# Patient Record
Sex: FEMALE | URBAN - METROPOLITAN AREA
[De-identification: names, ages, dates, MRNs, and addresses within clinical notes are randomized per-mention and may not be internally consistent; named-entity substitution may affect disease eponyms.]

---

## 2017-03-20 ENCOUNTER — APPOINTMENT (RX ONLY)
Age: 62
Setting detail: DERMATOLOGY
End: 2017-03-20

## 2017-03-20 DIAGNOSIS — D22 MELANOCYTIC NEVI: ICD-10-CM | Status: STABLE

## 2017-03-20 DIAGNOSIS — L82.0 INFLAMED SEBORRHEIC KERATOSIS: ICD-10-CM

## 2017-03-20 DIAGNOSIS — L72.0 EPIDERMAL CYST: ICD-10-CM | Status: STABLE

## 2017-03-20 DIAGNOSIS — Z85.828 PERSONAL HISTORY OF OTHER MALIGNANT NEOPLASM OF SKIN: ICD-10-CM | Status: RESOLVED

## 2017-03-20 DIAGNOSIS — L82.1 OTHER SEBORRHEIC KERATOSIS: ICD-10-CM | Status: STABLE

## 2017-03-20 DIAGNOSIS — L23.9 ALLERGIC CONTACT DERMATITIS, UNSPECIFIED CAUSE: ICD-10-CM | Status: RESOLVING

## 2017-03-20 PROBLEM — D48.5 NEOPLASM OF UNCERTAIN BEHAVIOR OF SKIN: Status: ACTIVE | Noted: 2017-03-20

## 2017-03-20 PROBLEM — D22.5 MELANOCYTIC NEVI OF TRUNK: Status: ACTIVE | Noted: 2017-03-20

## 2017-03-20 PROCEDURE — ? BIOPSY BY SHAVE METHOD

## 2017-03-20 PROCEDURE — ? REFUSAL OF TREATMENT

## 2017-03-20 PROCEDURE — ? COSMETIC CONSULTATION: BROWN SPOTS

## 2017-03-20 PROCEDURE — 99214 OFFICE O/P EST MOD 30 MIN: CPT | Mod: 25

## 2017-03-20 PROCEDURE — 11100: CPT

## 2017-03-20 PROCEDURE — ? COUNSELING

## 2017-03-20 ASSESSMENT — SEVERITY ASSESSMENT: SEVERITY: ALMOST CLEAR

## 2017-03-20 ASSESSMENT — LOCATION SIMPLE DESCRIPTION DERM
LOCATION SIMPLE: LEFT ANTERIOR NECK
LOCATION SIMPLE: LEFT SUPERIOR EYELID
LOCATION SIMPLE: INFERIOR FOREHEAD
LOCATION SIMPLE: CHEST
LOCATION SIMPLE: LEFT FOREARM
LOCATION SIMPLE: LEFT FOREHEAD
LOCATION SIMPLE: RIGHT SUPERIOR EYELID

## 2017-03-20 ASSESSMENT — LOCATION ZONE DERM
LOCATION ZONE: NECK
LOCATION ZONE: TRUNK
LOCATION ZONE: ARM
LOCATION ZONE: FACE
LOCATION ZONE: EYELID

## 2017-03-20 ASSESSMENT — LOCATION DETAILED DESCRIPTION DERM
LOCATION DETAILED: LEFT VENTRAL PROXIMAL FOREARM
LOCATION DETAILED: LEFT INFERIOR FOREHEAD
LOCATION DETAILED: MIDDLE STERNUM
LOCATION DETAILED: RIGHT LATERAL SUPERIOR EYELID
LOCATION DETAILED: LEFT LATERAL SUPERIOR EYELID
LOCATION DETAILED: INFERIOR MID FOREHEAD
LOCATION DETAILED: LEFT INFERIOR ANTERIOR NECK
LOCATION DETAILED: UPPER STERNUM

## 2017-03-20 NOTE — PROCEDURE: BIOPSY BY SHAVE METHOD
Silver Nitrate Text: The wound bed was treated with silver nitrate after the biopsy was performed.
Post-Care Instructions: I reviewed with the patient in detail post-care instructions. Patient is to keep the biopsy site dry overnight, and then apply bacitracin twice daily until healed. Patient may apply hydrogen peroxide soaks to remove any crusting.
Notification Instructions: Patient will be notified of biopsy results. However, patient instructed to call the office if not contacted within 2 weeks.
Render Post-Care Instructions In Note?: no
Consent: Verbal consent was obtained and risks were reviewed including but not limited to scarring, infection, bleeding, scabbing, incomplete removal, nerve damage and allergy to anesthesia.
Additional Anesthesia Volume In Cc (Will Not Render If 0): 0
Anesthesia Type: 1% lidocaine without epinephrine
Biopsy Method: Dermablade
Electrodesiccation Text: The wound bed was treated with electrodesiccation after the biopsy was performed.
Curettage Text: The wound bed was treated with curettage after the biopsy was performed.
Billing Type: Third-Party Bill
Biopsy Type: H and E
Anesthesia Volume In Cc: 0.5
Detail Level: Detailed
Dressing: bandage
Type Of Destruction Used: Electrodesiccation and Curettage
Size Of Lesion In Cm: 0.6
Additional Anticipated Plans: If malignant consider Mohs surgery
Electrodesiccation And Curettage Text: The wound bed was treated with electrodesiccation and curettage after the biopsy was performed.
Wound Care: Aquaphor
Hemostasis: Electrocautery
Cryotherapy Text: The wound bed was treated with cryotherapy after the biopsy was performed.